# Patient Record
Sex: FEMALE | Race: OTHER | Employment: UNEMPLOYED | URBAN - METROPOLITAN AREA
[De-identification: names, ages, dates, MRNs, and addresses within clinical notes are randomized per-mention and may not be internally consistent; named-entity substitution may affect disease eponyms.]

---

## 2021-07-16 ENCOUNTER — OFFICE VISIT (OUTPATIENT)
Dept: FAMILY MEDICINE CLINIC | Facility: CLINIC | Age: 21
End: 2021-07-16

## 2021-07-16 VITALS
TEMPERATURE: 98 F | HEIGHT: 68.5 IN | BODY MASS INDEX: 29.01 KG/M2 | HEART RATE: 83 BPM | WEIGHT: 193.63 LBS | DIASTOLIC BLOOD PRESSURE: 62 MMHG | OXYGEN SATURATION: 96 % | RESPIRATION RATE: 16 BRPM | SYSTOLIC BLOOD PRESSURE: 114 MMHG

## 2021-07-16 DIAGNOSIS — G89.29 CHRONIC PAIN OF LEFT KNEE: Primary | ICD-10-CM

## 2021-07-16 DIAGNOSIS — M25.562 CHRONIC PAIN OF LEFT KNEE: Primary | ICD-10-CM

## 2021-07-16 PROCEDURE — 3074F SYST BP LT 130 MM HG: CPT | Performed by: FAMILY MEDICINE

## 2021-07-16 PROCEDURE — 3008F BODY MASS INDEX DOCD: CPT | Performed by: FAMILY MEDICINE

## 2021-07-16 PROCEDURE — 3078F DIAST BP <80 MM HG: CPT | Performed by: FAMILY MEDICINE

## 2021-07-16 PROCEDURE — 99203 OFFICE O/P NEW LOW 30 MIN: CPT | Performed by: FAMILY MEDICINE

## 2021-07-16 NOTE — PROGRESS NOTES
CHIEF COMPLAINT: Patient presents with:  Establish Care  Knee Pain: L knee intermittent since March        HPI:     Allie Garrett is a 19year old female presents for knee pain. Vinicius Perkins is a 20 yo F here to establish care p/w knee pain. Left knee pain:  Sh diarrhea, nausea and vomiting. Musculoskeletal: Negative for gait problem and joint swelling. Left knee pain   Neurological: Negative for weakness and numbness. Pertinent positives and negatives noted in the the HPI.     PHYSICAL EXAM:   BP ROUTINE (3 VIEWS), LEFT (CPT=73562);  Future      Meds This Visit:  Requested Prescriptions      No prescriptions requested or ordered in this encounter       Health Maintenance:  Annual Physical Never done  HPV Vaccines(1 - 2-dose series) Never done  Annua

## 2021-07-16 NOTE — PATIENT INSTRUCTIONS
Knee Pain  Knee pain is very common. It’s especially common in active people who put a lot of pressure on their knees, like runners. It affects women more often than men. Your kneecap (patella) is a thick, round bone.  It covers and protects the front po your knee is giving out. You may have symptoms in one or both of your knees. Diagnosing knee pain  Your healthcare provider will ask about your medical history and your symptoms. Be sure to describe any activities that make your knee pain worse.  He or she check your gait for running or other sporting activities  · Stretch properly before and after exercise  · Replace your running shoes regularly  · Lose excess weight  When to call your healthcare provider  Call your healthcare provider right away if:  · You

## 2021-07-17 ENCOUNTER — HOSPITAL ENCOUNTER (OUTPATIENT)
Dept: GENERAL RADIOLOGY | Facility: HOSPITAL | Age: 21
Discharge: HOME | End: 2021-07-17
Attending: FAMILY MEDICINE
Payer: COMMERCIAL

## 2021-07-17 DIAGNOSIS — G89.29 CHRONIC PAIN OF LEFT KNEE: ICD-10-CM

## 2021-07-17 DIAGNOSIS — M25.562 CHRONIC PAIN OF LEFT KNEE: ICD-10-CM

## 2021-07-17 PROCEDURE — 73562 X-RAY EXAM OF KNEE 3: CPT | Performed by: FAMILY MEDICINE

## 2021-07-18 ENCOUNTER — TELEPHONE (OUTPATIENT)
Dept: FAMILY MEDICINE CLINIC | Facility: CLINIC | Age: 21
End: 2021-07-18

## 2021-07-18 DIAGNOSIS — M25.562 CHRONIC PAIN OF LEFT KNEE: Primary | ICD-10-CM

## 2021-07-18 DIAGNOSIS — G89.29 CHRONIC PAIN OF LEFT KNEE: Primary | ICD-10-CM

## 2021-07-18 NOTE — PROGRESS NOTES
XR Knee with mild medial compartment and lateral patellofemoral compartment joint space narrowing. Order MRI to r/o tendon/ligament injury.

## 2023-03-21 ENCOUNTER — TELEPHONE (OUTPATIENT)
Dept: FAMILY MEDICINE CLINIC | Facility: CLINIC | Age: 23
End: 2023-03-21

## 2025-07-23 ENCOUNTER — TELEPHONE (OUTPATIENT)
Dept: FAMILY MEDICINE CLINIC | Facility: CLINIC | Age: 25
End: 2025-07-23

## 2025-07-30 ENCOUNTER — PATIENT OUTREACH (OUTPATIENT)
Dept: CASE MANAGEMENT | Age: 25
End: 2025-07-30

## (undated) NOTE — LETTER
10/07/21        [address] [place] [address],[address] [id number]      Dear Vinicius Perkins,    Our records indicate that you have outstanding lab work and or testing that was ordered for you and has not yet been completed      MRI KNEE, LEFT     To provide yo